# Patient Record
Sex: MALE | NOT HISPANIC OR LATINO | URBAN - NONMETROPOLITAN AREA
[De-identification: names, ages, dates, MRNs, and addresses within clinical notes are randomized per-mention and may not be internally consistent; named-entity substitution may affect disease eponyms.]

---

## 2017-05-05 ENCOUNTER — OFFICE VISIT (OUTPATIENT)
Dept: FAMILY MEDICINE CLINIC | Facility: CLINIC | Age: 37
End: 2017-05-05

## 2017-05-05 VITALS
DIASTOLIC BLOOD PRESSURE: 68 MMHG | BODY MASS INDEX: 26.22 KG/M2 | WEIGHT: 177 LBS | HEIGHT: 69 IN | HEART RATE: 92 BPM | SYSTOLIC BLOOD PRESSURE: 114 MMHG | TEMPERATURE: 97.1 F | OXYGEN SATURATION: 97 %

## 2017-05-05 DIAGNOSIS — J30.9 ALLERGIC RHINITIS, UNSPECIFIED ALLERGIC RHINITIS TRIGGER, UNSPECIFIED RHINITIS SEASONALITY: Primary | ICD-10-CM

## 2017-05-05 PROCEDURE — 99213 OFFICE O/P EST LOW 20 MIN: CPT | Performed by: NURSE PRACTITIONER

## 2017-05-05 RX ORDER — BENZONATATE 200 MG/1
200 CAPSULE ORAL 3 TIMES DAILY PRN
Qty: 60 CAPSULE | Refills: 0 | Status: SHIPPED | OUTPATIENT
Start: 2017-05-05 | End: 2017-06-15

## 2017-05-05 RX ORDER — INFLUENZA A VIRUS A/SINGAPORE/GP1908/2015 IVR-180A (H1N1) ANTIGEN (PROPIOLACTONE INACTIVATED), INFLUENZA A VIRUS A/SINGAPORE/INFIMH-16-0019/2016 IVR-186 (H3N2) ANTIGEN (PROPIOLACTONE INACTIVATED) AND INFLUENZA B VIRUS B/MARYLAND/15/2016 ANTIGEN (PROPIOLACTONE INACTIVATED) 15; 15; 15 UG/.5ML; UG/.5ML; UG/.5ML
0.5 INJECTION, SUSPENSION INTRAMUSCULAR
Refills: 0 | COMMUNITY
Start: 2017-01-27 | End: 2017-06-29

## 2017-05-07 PROBLEM — J30.9 ALLERGIC RHINITIS: Status: ACTIVE | Noted: 2017-05-07

## 2017-05-19 ENCOUNTER — OFFICE VISIT (OUTPATIENT)
Dept: FAMILY MEDICINE CLINIC | Facility: CLINIC | Age: 37
End: 2017-05-19

## 2017-05-19 VITALS
DIASTOLIC BLOOD PRESSURE: 64 MMHG | WEIGHT: 168.4 LBS | HEIGHT: 69 IN | OXYGEN SATURATION: 99 % | BODY MASS INDEX: 24.94 KG/M2 | HEART RATE: 105 BPM | SYSTOLIC BLOOD PRESSURE: 118 MMHG | TEMPERATURE: 96.8 F

## 2017-05-19 DIAGNOSIS — R05.9 COUGH: Primary | ICD-10-CM

## 2017-05-19 DIAGNOSIS — J40 BRONCHITIS: ICD-10-CM

## 2017-05-19 PROCEDURE — 96372 THER/PROPH/DIAG INJ SC/IM: CPT | Performed by: INTERNAL MEDICINE

## 2017-05-19 PROCEDURE — 99213 OFFICE O/P EST LOW 20 MIN: CPT | Performed by: INTERNAL MEDICINE

## 2017-05-19 RX ORDER — LEVOFLOXACIN 500 MG/1
500 TABLET, FILM COATED ORAL DAILY
Qty: 10 TABLET | Refills: 0 | Status: SHIPPED | OUTPATIENT
Start: 2017-05-19 | End: 2017-05-26

## 2017-05-19 RX ORDER — PREDNISONE 20 MG/1
20 TABLET ORAL DAILY
Qty: 10 TABLET | Refills: 0 | Status: SHIPPED | OUTPATIENT
Start: 2017-05-19 | End: 2017-05-26 | Stop reason: SDUPTHER

## 2017-05-19 RX ORDER — TRIAMCINOLONE ACETONIDE 40 MG/ML
40 INJECTION, SUSPENSION INTRA-ARTICULAR; INTRAMUSCULAR ONCE
Status: COMPLETED | OUTPATIENT
Start: 2017-05-19 | End: 2017-05-19

## 2017-05-19 RX ADMIN — TRIAMCINOLONE ACETONIDE 40 MG: 40 INJECTION, SUSPENSION INTRA-ARTICULAR; INTRAMUSCULAR at 12:28

## 2017-05-26 ENCOUNTER — OFFICE VISIT (OUTPATIENT)
Dept: FAMILY MEDICINE CLINIC | Facility: CLINIC | Age: 37
End: 2017-05-26

## 2017-05-26 VITALS
HEIGHT: 69 IN | TEMPERATURE: 97.6 F | SYSTOLIC BLOOD PRESSURE: 102 MMHG | DIASTOLIC BLOOD PRESSURE: 60 MMHG | OXYGEN SATURATION: 98 % | WEIGHT: 177.2 LBS | HEART RATE: 80 BPM | BODY MASS INDEX: 26.25 KG/M2

## 2017-05-26 DIAGNOSIS — J45.909 UNCOMPLICATED ASTHMA, UNSPECIFIED ASTHMA SEVERITY: ICD-10-CM

## 2017-05-26 DIAGNOSIS — R05.9 COUGH: Primary | ICD-10-CM

## 2017-05-26 PROCEDURE — 99213 OFFICE O/P EST LOW 20 MIN: CPT | Performed by: INTERNAL MEDICINE

## 2017-05-26 RX ORDER — PREDNISONE 20 MG/1
20 TABLET ORAL DAILY
Qty: 20 TABLET | Refills: 0 | Status: SHIPPED | OUTPATIENT
Start: 2017-05-26 | End: 2017-06-15

## 2017-05-26 RX ORDER — LEVOFLOXACIN 500 MG/1
500 TABLET, FILM COATED ORAL DAILY
COMMUNITY
End: 2017-06-15

## 2017-05-26 RX ORDER — ALBUTEROL SULFATE 90 UG/1
2 AEROSOL, METERED RESPIRATORY (INHALATION) EVERY 4 HOURS PRN
Qty: 8 G | Refills: 11 | Status: SHIPPED | OUTPATIENT
Start: 2017-05-26

## 2017-06-15 ENCOUNTER — OFFICE VISIT (OUTPATIENT)
Dept: FAMILY MEDICINE CLINIC | Facility: CLINIC | Age: 37
End: 2017-06-15

## 2017-06-15 VITALS
OXYGEN SATURATION: 98 % | BODY MASS INDEX: 25.39 KG/M2 | SYSTOLIC BLOOD PRESSURE: 108 MMHG | HEART RATE: 88 BPM | DIASTOLIC BLOOD PRESSURE: 74 MMHG | WEIGHT: 171.4 LBS | HEIGHT: 69 IN

## 2017-06-15 DIAGNOSIS — Z00.00 GENERAL MEDICAL EXAM: ICD-10-CM

## 2017-06-15 DIAGNOSIS — R05.9 COUGH: Primary | ICD-10-CM

## 2017-06-15 DIAGNOSIS — J45.909 UNCOMPLICATED ASTHMA, UNSPECIFIED ASTHMA SEVERITY: ICD-10-CM

## 2017-06-15 PROCEDURE — 99213 OFFICE O/P EST LOW 20 MIN: CPT | Performed by: INTERNAL MEDICINE

## 2017-06-15 RX ORDER — PREDNISONE 20 MG/1
20 TABLET ORAL DAILY
Qty: 14 TABLET | Refills: 0 | Status: SHIPPED | OUTPATIENT
Start: 2017-06-15 | End: 2017-06-29

## 2017-06-15 NOTE — PROGRESS NOTES
Subjective   Juan M Diallo is a 36 y.o. male.     Chief Complaint   Patient presents with   • Follow-up     1 month f/u        History of Present Illness     Pt in f/u on the bronchitis/coughing.     Pt says he is doing much better.  He says he went 4 or 5 days without coughing and then got another cough but nothing nearly as bad as it was.  He says he thinks it may have been his fault that the cough came back because he took the medication until he got some relief and he slacked off the medication and then it came back.        The following portions of the patient's history were reviewed and updated as appropriate: allergies, current medications, past family history, past medical history, past social history, past surgical history and problem list.    Review of Systems   Constitutional: Negative for activity change, appetite change, fatigue and unexpected weight change.   HENT: Negative for ear pain, facial swelling and hearing loss.    Respiratory: Positive for cough. Negative for chest tightness, shortness of breath and wheezing.    Cardiovascular: Negative for chest pain, palpitations and leg swelling.   Gastrointestinal: Negative for abdominal pain.   Genitourinary: Negative for difficulty urinating, dysuria, flank pain, frequency and urgency.   Musculoskeletal: Negative for arthralgias and back pain.   Skin: Negative for color change and rash.   Allergic/Immunologic: Negative for environmental allergies and food allergies.   Neurological: Negative for dizziness, syncope, weakness, numbness and headaches.   Hematological: Negative for adenopathy.   Psychiatric/Behavioral: Negative for confusion, decreased concentration, dysphoric mood, sleep disturbance and suicidal ideas. The patient is not nervous/anxious.    All other systems reviewed and are negative.      Objective   Physical Exam   Constitutional: He is oriented to person, place, and time. Vital signs are normal. He appears well-developed and  well-nourished.   HENT:   Head: Normocephalic and atraumatic.   Right Ear: External ear normal.   Left Ear: External ear normal.   Nose: Nose normal.   Eyes: Conjunctivae and EOM are normal. Pupils are equal, round, and reactive to light.   Neck: Normal range of motion. Neck supple.   Cardiovascular: Normal rate and regular rhythm.  Exam reveals no gallop and no friction rub.    No murmur heard.  Pulmonary/Chest: Effort normal and breath sounds normal. No respiratory distress. He has no wheezes. He has no rales.   Abdominal: Soft. He exhibits no distension. There is no tenderness. There is no rebound and no guarding.   Musculoskeletal: Normal range of motion. He exhibits no edema.   Neurological: He is alert and oriented to person, place, and time. No cranial nerve deficit.   Skin: Skin is warm and dry. No rash noted.   Psychiatric: He has a normal mood and affect. His behavior is normal. Judgment and thought content normal. His mood appears not anxious. He does not exhibit a depressed mood. He expresses no homicidal and no suicidal ideation. He expresses no suicidal plans and no homicidal plans.   Nursing note and vitals reviewed.      Assessment/Plan   Juan M was seen today for follow-up.    Diagnoses and all orders for this visit:    Cough    General medical exam  -     CBC & Differential; Future  -     Comprehensive Metabolic Panel; Future  -     Lipid Panel; Future  -     TSH; Future  -     T4, Free; Future    Uncomplicated asthma, unspecified asthma severity  -     Ambulatory Referral to Pulmonology    Other orders  -     predniSONE (DELTASONE) 20 MG tablet; Take 1 tablet by mouth Daily.        Continue the inhalers daily    Schedule Pulmonary for the chronic cough and he is told that if this gets better he may cancel the appointment.    Refill the prednisone and TussinEx for asthma    Labs due: CBC CMP Lipids Thyroids  RTC 2 weeks after labs are done to review them.      Scribed for Dr. Smith by Joanna  Lydia, [facility] 06/15/17

## 2017-06-23 ENCOUNTER — LAB (OUTPATIENT)
Dept: LAB | Facility: OTHER | Age: 37
End: 2017-06-23

## 2017-06-23 DIAGNOSIS — Z00.00 GENERAL MEDICAL EXAM: ICD-10-CM

## 2017-06-23 LAB
ALBUMIN SERPL-MCNC: 4.2 G/DL (ref 3.2–5.5)
ALBUMIN/GLOB SERPL: 1.6 G/DL (ref 1–3)
ALP SERPL-CCNC: 60 U/L (ref 15–121)
ALT SERPL W P-5'-P-CCNC: 13 U/L (ref 10–60)
ANION GAP SERPL CALCULATED.3IONS-SCNC: 7 MMOL/L (ref 5–15)
AST SERPL-CCNC: 13 U/L (ref 10–60)
BASOPHILS # BLD AUTO: 0.02 10*3/MM3 (ref 0–0.2)
BASOPHILS NFR BLD AUTO: 0.3 % (ref 0–2)
BILIRUB SERPL-MCNC: 0.9 MG/DL (ref 0.2–1)
BUN BLD-MCNC: 8 MG/DL (ref 8–25)
BUN/CREAT SERPL: 10 (ref 7–25)
CALCIUM SPEC-SCNC: 9.2 MG/DL (ref 8.4–10.8)
CHLORIDE SERPL-SCNC: 102 MMOL/L (ref 100–112)
CHOLEST SERPL-MCNC: 150 MG/DL (ref 150–200)
CO2 SERPL-SCNC: 31 MMOL/L (ref 20–32)
CREAT BLD-MCNC: 0.8 MG/DL (ref 0.4–1.3)
DEPRECATED RDW RBC AUTO: 39.1 FL (ref 35.1–43.9)
EOSINOPHIL # BLD AUTO: 0.1 10*3/MM3 (ref 0–0.7)
EOSINOPHIL NFR BLD AUTO: 1.6 % (ref 0–7)
ERYTHROCYTE [DISTWIDTH] IN BLOOD BY AUTOMATED COUNT: 12.9 % (ref 11.5–14.5)
GFR SERPL CREATININE-BSD FRML MDRD: 109 ML/MIN/1.73 (ref 70–162)
GFR SERPL CREATININE-BSD FRML MDRD: 132 ML/MIN/1.73 (ref 70–162)
GLOBULIN UR ELPH-MCNC: 2.6 GM/DL (ref 2.5–4.6)
GLUCOSE BLD-MCNC: 92 MG/DL (ref 70–100)
HCT VFR BLD AUTO: 42.1 % (ref 39–49)
HDLC SERPL-MCNC: 58 MG/DL (ref 35–100)
HGB BLD-MCNC: 14.9 G/DL (ref 13.7–17.3)
LDLC SERPL CALC-MCNC: 82 MG/DL
LDLC/HDLC SERPL: 1.41 {RATIO}
LYMPHOCYTES # BLD AUTO: 2.19 10*3/MM3 (ref 0.6–4.2)
LYMPHOCYTES NFR BLD AUTO: 35 % (ref 10–50)
MCH RBC QN AUTO: 30.1 PG (ref 26.5–34)
MCHC RBC AUTO-ENTMCNC: 35.4 G/DL (ref 31.5–36.3)
MCV RBC AUTO: 85.1 FL (ref 80–98)
MONOCYTES # BLD AUTO: 0.62 10*3/MM3 (ref 0–0.9)
MONOCYTES NFR BLD AUTO: 9.9 % (ref 0–12)
NEUTROPHILS # BLD AUTO: 3.33 10*3/MM3 (ref 2–8.6)
NEUTROPHILS NFR BLD AUTO: 53.2 % (ref 37–80)
PLATELET # BLD AUTO: 242 10*3/MM3 (ref 150–450)
PMV BLD AUTO: 10.5 FL (ref 8–12)
POTASSIUM BLD-SCNC: 4.3 MMOL/L (ref 3.4–5.4)
PROT SERPL-MCNC: 6.8 G/DL (ref 6.7–8.2)
RBC # BLD AUTO: 4.95 10*6/MM3 (ref 4.37–5.74)
SODIUM BLD-SCNC: 140 MMOL/L (ref 134–146)
T4 FREE SERPL-MCNC: 1.21 NG/DL (ref 0.78–2.19)
TRIGL SERPL-MCNC: 50 MG/DL (ref 35–160)
TSH SERPL DL<=0.05 MIU/L-ACNC: 0.89 MIU/ML (ref 0.46–4.68)
VLDLC SERPL-MCNC: 10 MG/DL
WBC NRBC COR # BLD: 6.26 10*3/MM3 (ref 3.2–9.8)

## 2017-06-23 PROCEDURE — 80053 COMPREHEN METABOLIC PANEL: CPT | Performed by: INTERNAL MEDICINE

## 2017-06-23 PROCEDURE — 84443 ASSAY THYROID STIM HORMONE: CPT | Performed by: INTERNAL MEDICINE

## 2017-06-23 PROCEDURE — 84439 ASSAY OF FREE THYROXINE: CPT | Performed by: INTERNAL MEDICINE

## 2017-06-23 PROCEDURE — 85025 COMPLETE CBC W/AUTO DIFF WBC: CPT | Performed by: INTERNAL MEDICINE

## 2017-06-23 PROCEDURE — 80061 LIPID PANEL: CPT | Performed by: INTERNAL MEDICINE

## 2017-06-29 ENCOUNTER — OFFICE VISIT (OUTPATIENT)
Dept: PULMONOLOGY | Facility: CLINIC | Age: 37
End: 2017-06-29

## 2017-06-29 VITALS
DIASTOLIC BLOOD PRESSURE: 80 MMHG | WEIGHT: 171 LBS | HEIGHT: 69 IN | SYSTOLIC BLOOD PRESSURE: 110 MMHG | HEART RATE: 81 BPM | OXYGEN SATURATION: 98 % | BODY MASS INDEX: 25.33 KG/M2

## 2017-06-29 DIAGNOSIS — J45.30 MILD PERSISTENT ASTHMA WITHOUT COMPLICATION: ICD-10-CM

## 2017-06-29 DIAGNOSIS — R05.9 COUGH: Primary | ICD-10-CM

## 2017-06-29 DIAGNOSIS — J30.9 ALLERGIC RHINITIS, UNSPECIFIED ALLERGIC RHINITIS TRIGGER, UNSPECIFIED RHINITIS SEASONALITY: ICD-10-CM

## 2017-06-29 DIAGNOSIS — K21.9 GASTROESOPHAGEAL REFLUX DISEASE WITHOUT ESOPHAGITIS: ICD-10-CM

## 2017-06-29 PROCEDURE — 99204 OFFICE O/P NEW MOD 45 MIN: CPT | Performed by: INTERNAL MEDICINE

## 2017-06-29 PROCEDURE — 94060 EVALUATION OF WHEEZING: CPT | Performed by: INTERNAL MEDICINE

## 2017-06-29 PROCEDURE — 94727 GAS DIL/WSHOT DETER LNG VOL: CPT | Performed by: INTERNAL MEDICINE

## 2017-06-29 PROCEDURE — 94729 DIFFUSING CAPACITY: CPT | Performed by: INTERNAL MEDICINE

## 2017-06-29 RX ORDER — FLUTICASONE PROPIONATE 50 MCG
2 SPRAY, SUSPENSION (ML) NASAL DAILY
Qty: 1 EACH | Refills: 11 | Status: SHIPPED | OUTPATIENT
Start: 2017-06-29 | End: 2017-07-29

## 2017-06-29 NOTE — PROGRESS NOTES
"Pulmonary Consultation    Subjective     Chief Complaint   Patient presents with   • Asthma     ref by Sarah        History of Present Illness  Juan M Diallo is a 36 y.o. male with a PMH significant for allergies, GERD, and OA who presents for evaluation of cough. Pt states he had a chronic cough for 3 months starting in March, around mowing season. He tried cough drops, cough syrups, and tessalon perles. Pt admits to postnasal gtt and nasal congestion which was worse at night if he went outside. He has had allergies in the past but he does not normally take any allergy meds. Pt states his cough resolved 2wks ago, but it returned after he siphoned gas and got some into his mouth. His cough is non-productive. Pt has been seen multiple times at [facility] with various treatments. He was given prednisone at his last visit but he is not taking. Pt was given albuterol and Dulera every once in a while but he does not feel they help. He denies dyspnea, chest pain, or wheeze. Pt does report wheeze the last time he saw Dr. Smith. He reports he had histo when he was young and had \"real bad bronchial scars\". Pt denies prior diagnosis of asthma. He is a never smoker, but his fahter smokes in the house. Pt is a  but does a lot of yard work. He has multiple family members have COPD.     Review of Systems: History obtained from chart review and the patient.  Review of Systems   Respiratory: Positive for cough and wheezing.    Musculoskeletal: Positive for arthralgias.     As described in the HPI. Otherwise, remainder of ROS (14 systems) were negative.    Patient Active Problem List   Diagnosis   • Routine general medical examination at a health care facility   • Gastroesophageal reflux disease without esophagitis   • Primary osteoarthritis involving multiple joints   • Allergic rhinitis         Current Outpatient Prescriptions:   •  albuterol (VENTOLIN HFA) 108 (90 BASE) MCG/ACT inhaler, Inhale 2 puffs Every 4 (Four) " "Hours As Needed for Wheezing or Shortness of Air., Disp: 8 g, Rfl: 11  •  ibuprofen (ADVIL,MOTRIN) 400 MG tablet, Take 1 tablet by mouth 3 (Three) Times a Day. with food, Disp: 90 tablet, Rfl: 5  •  omeprazole (PRILOSEC) 40 MG capsule, Take 1 capsule by mouth Daily. Med Name: Prilosec 40 mg capsule,delayed release, Disp: 30 capsule, Rfl: 5  •  fluticasone (FLONASE) 50 MCG/ACT nasal spray, 2 sprays into each nostril Daily for 30 days., Disp: 1 each, Rfl: 11    Past Medical History:   Diagnosis Date   • Caffeine use     Uses caffeine   • Encounter for general adult medical examination without abnormal findings    • Gastroesophageal reflux disease    • Osteoarthritis      No past surgical history on file.  Social History     Social History   • Marital status: Single     Spouse name: N/A   • Number of children: N/A   • Years of education: N/A     Social History Main Topics   • Smoking status: Never Smoker   • Smokeless tobacco: Never Used   • Alcohol use No   • Drug use: No   • Sexual activity: Not Asked      Comment: Single     Other Topics Concern   • None     Social History Narrative     Family History   Problem Relation Age of Onset   • Heart disease Father    • Other Father      Respiratory disorder   • Diabetes Paternal Grandmother    • Heart disease Paternal Grandmother    • Other Paternal Grandmother      Respiratory disorder   • Diabetes Paternal Grandfather    • Heart disease Paternal Grandfather    • Other Paternal Grandfather      Respiratory disorder          Objective     Blood pressure 110/80, pulse 81, height 69\" (175.3 cm), weight 171 lb (77.6 kg), SpO2 98 %.  Physical Exam   Constitutional: He is oriented to person, place, and time. Vital signs are normal. He appears well-developed and well-nourished.   HENT:   Head: Normocephalic and atraumatic.   Nose: Mucosal edema and rhinorrhea present.   Mouth/Throat: Uvula is midline, oropharynx is clear and moist and mucous membranes are normal.   Mallampati 2, " cobblestoning   Eyes: Conjunctivae, EOM and lids are normal. Pupils are equal, round, and reactive to light.   Neck: Trachea normal and normal range of motion. No tracheal tenderness present. No thyroid mass present.   Cardiovascular: Normal rate, regular rhythm and normal heart sounds.  Exam reveals no gallop.    No murmur heard.  Pulmonary/Chest: Effort normal and breath sounds normal. No respiratory distress. He has no decreased breath sounds. He has no wheezes. He has no rhonchi. Chest wall is not dull to percussion. He exhibits no tenderness.   Abdominal: Soft. Normal appearance and bowel sounds are normal. There is no tenderness.       Vascular Status -  His exam exhibits no right foot edema. His exam exhibits no left foot edema.  Lymphadenopathy:        Head (right side): No submandibular adenopathy present.        Head (left side): No submandibular adenopathy present.     He has no cervical adenopathy.        Right: No supraclavicular adenopathy present.        Left: No supraclavicular adenopathy present.   Neurological: He is alert and oriented to person, place, and time.   Skin: Skin is warm and dry. No cyanosis. Nails show no clubbing.   Psychiatric: He has a normal mood and affect. His speech is normal and behavior is normal. Judgment normal.   Nursing note and vitals reviewed.      PFTs: 6/29/17  Ratio 95  FVC 3.55/ 68%  FEV1 3.36/ 81%  TLC 3.91/ 58%  DLCO 32.44/ 104%  Mixed obstruction and restriction with air trapping and significant bronchodilator response.  Normal diffusing capacity.  No comparative data available.     Radiology (independently reviewed and interpreted by me): CXR 5/19/17 showed NACPD       Assessment/Plan     Juan M was seen today for asthma.    Diagnoses and all orders for this visit:    Cough  -     Full Pulmonary Function Test With Bronchodilator    Allergic rhinitis, unspecified allergic rhinitis trigger, unspecified rhinitis seasonality  -     fluticasone (FLONASE) 50 MCG/ACT  nasal spray; 2 sprays into each nostril Daily for 30 days.    Gastroesophageal reflux disease without esophagitis    Mild persistent asthma without complication         Discussion/ Recommendations:   PFTs are consistent with mixed obstruction and restriction based on significant bronchodilator response.  Chest x-ray was unremarkable.  I think his cough is primarily mediated by allergic rhinitis and possibly aggravated by recent ingestion of small amounts of gasoline.  He does not have any symptoms concerning for asthma, but is certainly possible he has mild underlying reactive airway disease likely triggered by allergies.  I do not think that he needs long-acting bronchodilators or chronic prednisone.    -Stop Dulera and prednisone, as he is not using them anyway.  -Start Flonase daily.  Instructed on proper technique.  -Advised to use nasal saline multiple times a day especially after he has been seen in the yard to help flush out any allergens from his nostrils.  -Okay to use albuterol as needed for shortness of breath or wheeze.  -Counseled patient to no longer siphon gas using his mouth, due to risk of ingestion or inhalation causing pneumonitis and respiratory failure.  Recommended he either uses a manual pump or waits until gas can be obtained in a gas container.         Return in about 2 months (around 8/29/2017) for Recheck.      Thank you for allowing me to participate in the care of Juan M Diallo. Please do not hesitate to contact me with any questions.         This document has been electronically signed by Yoli Lott MD on June 29, 2017 11:02 AM      Dictated using Dragon

## 2017-06-29 NOTE — PATIENT INSTRUCTIONS
Start flonaes 2 sprays each nostril daily. If your insurance does not cover it shop around and get the cheapest store brand of flonase you can find.  Use nasal saline several times a day to clean out you nose

## 2019-01-18 ENCOUNTER — PATIENT OUTREACH (OUTPATIENT)
Dept: CASE MANAGEMENT | Facility: OTHER | Age: 39
End: 2019-01-18

## 2019-03-08 ENCOUNTER — PATIENT OUTREACH (OUTPATIENT)
Dept: CASE MANAGEMENT | Facility: OTHER | Age: 39
End: 2019-03-08

## 2019-04-19 ENCOUNTER — PATIENT OUTREACH (OUTPATIENT)
Dept: CASE MANAGEMENT | Facility: OTHER | Age: 39
End: 2019-04-19

## 2019-05-03 ENCOUNTER — PATIENT OUTREACH (OUTPATIENT)
Dept: CASE MANAGEMENT | Facility: OTHER | Age: 39
End: 2019-05-03